# Patient Record
Sex: MALE | Race: OTHER | Employment: OTHER | URBAN - METROPOLITAN AREA
[De-identification: names, ages, dates, MRNs, and addresses within clinical notes are randomized per-mention and may not be internally consistent; named-entity substitution may affect disease eponyms.]

---

## 2019-08-12 NOTE — PATIENT DISCUSSION
RLE OU; OS 1st/ [employer] OS Ivana/Custom OS Ivana/ B+ OS Ivana/ B OS Ivana/ TMF  Ivana/ Custom OD Ivana vs Custom OD -1.50 to -1.75/ B + OD Ivana/ B OD Ivana.

## 2019-08-12 NOTE — PATIENT DISCUSSION
If pt interested in [address], will need contact lens trial 1st with Dr. Roque Sheffield. Pt has never worn contacts before.

## 2021-06-17 ENCOUNTER — MISSION CATARACT SCREENING (OUTPATIENT)
Dept: URBAN - METROPOLITAN AREA CLINIC 36 | Facility: CLINIC | Age: 46
End: 2021-06-17

## 2021-06-17 DIAGNOSIS — H25.812: ICD-10-CM

## 2021-06-17 DIAGNOSIS — H52.7: ICD-10-CM

## 2021-06-17 PROCEDURE — 92015GRNC REFRACTION GLASSES RECHECK - NO CHARGE

## 2021-06-17 PROCEDURE — 99499MCS MISSION CATARACT PRELIMINARY SCREENINGS

## 2021-06-17 ASSESSMENT — VISUAL ACUITY
OD_SC: 20/25-1
OD_SC: J2
OS_SC: CF 4FT
OS_SC: >J10

## 2021-06-17 ASSESSMENT — TONOMETRY
OS_IOP_MMHG: 17
OD_IOP_MMHG: 17

## 2021-07-14 ENCOUNTER — MISSION CATARACT COMP/SO'S (OUTPATIENT)
Dept: URBAN - METROPOLITAN AREA CLINIC 35 | Facility: CLINIC | Age: 46
End: 2021-07-14

## 2021-07-14 DIAGNOSIS — H25.812: ICD-10-CM

## 2021-07-14 PROCEDURE — V2799PMN IMPRIMIS PRED-MOXI-NEPAF 5ML

## 2021-07-14 PROCEDURE — 99499MCC MISSION CATARACT COMP EYE EXAM / SOS

## 2021-07-14 ASSESSMENT — TONOMETRY
OS_IOP_MMHG: 17
OD_IOP_MMHG: 17

## 2021-07-14 ASSESSMENT — VISUAL ACUITY: OD_SC: 20/25+2

## 2021-08-11 ENCOUNTER — MISSION CATARACT SURGERY/PROCEDURE (OUTPATIENT)
Dept: URBAN - METROPOLITAN AREA CLINIC 35 | Facility: CLINIC | Age: 46
End: 2021-08-11

## 2021-08-11 DIAGNOSIS — H25.812: ICD-10-CM

## 2021-08-11 PROCEDURE — 66984MC MISSION CATARACT SURGERY (ALL INCLUSIVE)

## 2021-08-12 ENCOUNTER — MISSION CATARACT POST OP (OUTPATIENT)
Dept: URBAN - METROPOLITAN AREA CLINIC 35 | Facility: CLINIC | Age: 46
End: 2021-08-12

## 2021-08-12 DIAGNOSIS — Z96.1: ICD-10-CM

## 2021-08-12 PROCEDURE — 99024 POSTOP FOLLOW-UP VISIT: CPT

## 2021-08-12 ASSESSMENT — VISUAL ACUITY
OD_SC: 20/25-1
OS_SC: 20/40-1

## 2021-08-12 ASSESSMENT — TONOMETRY
OD_IOP_MMHG: 16
OS_IOP_MMHG: 16

## 2021-09-01 ENCOUNTER — POST-OP CATARACT (OUTPATIENT)
Dept: URBAN - METROPOLITAN AREA CLINIC 35 | Facility: CLINIC | Age: 46
End: 2021-09-01

## 2021-09-01 DIAGNOSIS — Z96.1: ICD-10-CM

## 2021-09-01 PROCEDURE — 99024 POSTOP FOLLOW-UP VISIT: CPT

## 2021-09-01 ASSESSMENT — VISUAL ACUITY
OD_SC: 20/20-1
OS_PH: 20/40+1
OS_SC: 20/50

## 2021-09-01 ASSESSMENT — TONOMETRY
OS_IOP_MMHG: 15
OD_IOP_MMHG: 16

## 2022-09-07 ENCOUNTER — PREPPED CHART (OUTPATIENT)
Dept: URBAN - METROPOLITAN AREA CLINIC 35 | Facility: CLINIC | Age: 47
End: 2022-09-07